# Patient Record
Sex: FEMALE | URBAN - METROPOLITAN AREA
[De-identification: names, ages, dates, MRNs, and addresses within clinical notes are randomized per-mention and may not be internally consistent; named-entity substitution may affect disease eponyms.]

---

## 2019-09-26 ENCOUNTER — APPOINTMENT (OUTPATIENT)
Dept: URBAN - METROPOLITAN AREA SURGERY 9 | Age: 54
Setting detail: DERMATOLOGY
End: 2019-09-27

## 2019-09-26 DIAGNOSIS — L72.0 EPIDERMAL CYST: ICD-10-CM

## 2019-09-26 DIAGNOSIS — L82.0 INFLAMED SEBORRHEIC KERATOSIS: ICD-10-CM

## 2019-09-26 DIAGNOSIS — L82.1 OTHER SEBORRHEIC KERATOSIS: ICD-10-CM

## 2019-09-26 PROCEDURE — OTHER COUNSELING: OTHER

## 2019-09-26 PROCEDURE — OTHER OTHER: OTHER

## 2019-09-26 PROCEDURE — OTHER REASSURANCE: OTHER

## 2019-09-26 PROCEDURE — OTHER LIQUID NITROGEN (COSMETIC): OTHER

## 2019-09-26 PROCEDURE — 99202 OFFICE O/P NEW SF 15 MIN: CPT

## 2019-09-26 ASSESSMENT — LOCATION SIMPLE DESCRIPTION DERM
LOCATION SIMPLE: LEFT CHEEK
LOCATION SIMPLE: LEFT LIP
LOCATION SIMPLE: RIGHT CHEEK
LOCATION SIMPLE: LEFT ELBOW

## 2019-09-26 ASSESSMENT — LOCATION ZONE DERM
LOCATION ZONE: ARM
LOCATION ZONE: FACE
LOCATION ZONE: LIP

## 2019-09-26 ASSESSMENT — LOCATION DETAILED DESCRIPTION DERM
LOCATION DETAILED: RIGHT INFERIOR MEDIAL MALAR CHEEK
LOCATION DETAILED: LEFT SUPERIOR CENTRAL MALAR CHEEK
LOCATION DETAILED: LEFT ELBOW
LOCATION DETAILED: RIGHT SUPERIOR CENTRAL MALAR CHEEK
LOCATION DETAILED: LEFT UPPER CUTANEOUS LIP

## 2019-09-26 NOTE — PROCEDURE: OTHER
Note Text (......Xxx Chief Complaint.): This diagnosis correlates with the
Detail Level: Simple
Other (Free Text): Offered LN2. Pt declines as it is not bothersome at this time.

## 2019-09-26 NOTE — PROCEDURE: REASSURANCE
Include Location In Plan?: No
Detail Level: Detailed
Detail Level: Simple
Additional Notes (Optional): Recommended seeing Melissa for extractions
Additional Note: Very small at this time. Had an SK on left cheek treated today and I recommend seeing how she heals from that before considering LN2 treatment to this lesion

## 2022-08-03 ENCOUNTER — APPOINTMENT (OUTPATIENT)
Dept: URBAN - METROPOLITAN AREA SURGERY 9 | Age: 57
Setting detail: DERMATOLOGY
End: 2022-08-03

## 2022-08-03 DIAGNOSIS — L72.8 OTHER FOLLICULAR CYSTS OF THE SKIN AND SUBCUTANEOUS TISSUE: ICD-10-CM

## 2022-08-03 PROBLEM — D48.5 NEOPLASM OF UNCERTAIN BEHAVIOR OF SKIN: Status: ACTIVE | Noted: 2022-08-03

## 2022-08-03 PROCEDURE — 11102 TANGNTL BX SKIN SINGLE LES: CPT

## 2022-08-03 PROCEDURE — OTHER BIOPSY BY SHAVE METHOD: OTHER

## 2022-08-03 ASSESSMENT — LOCATION DETAILED DESCRIPTION DERM: LOCATION DETAILED: COLUMELLA

## 2022-08-03 ASSESSMENT — LOCATION SIMPLE DESCRIPTION DERM: LOCATION SIMPLE: NOSE

## 2022-08-03 ASSESSMENT — LOCATION ZONE DERM: LOCATION ZONE: NOSE

## 2023-03-22 ENCOUNTER — APPOINTMENT (OUTPATIENT)
Dept: URBAN - METROPOLITAN AREA SURGERY 9 | Age: 58
Setting detail: DERMATOLOGY
End: 2023-03-22

## 2023-03-22 PROBLEM — D48.5 NEOPLASM OF UNCERTAIN BEHAVIOR OF SKIN: Status: ACTIVE | Noted: 2023-03-22

## 2023-03-22 PROCEDURE — 11102 TANGNTL BX SKIN SINGLE LES: CPT

## 2023-03-22 PROCEDURE — OTHER BIOPSY BY SHAVE METHOD: OTHER

## 2023-03-22 PROCEDURE — OTHER MIPS QUALITY: OTHER

## 2023-04-25 ENCOUNTER — APPOINTMENT (OUTPATIENT)
Dept: URBAN - METROPOLITAN AREA SURGERY 9 | Age: 58
Setting detail: DERMATOLOGY
End: 2023-04-27

## 2023-04-25 PROBLEM — D23.39 OTHER BENIGN NEOPLASM OF SKIN OF OTHER PARTS OF FACE: Status: ACTIVE | Noted: 2023-04-25

## 2023-04-25 PROCEDURE — OTHER CONSULTATION FOR EXCISION: OTHER

## 2023-04-25 PROCEDURE — OTHER RETURN TO REFERRING PROVIDER: OTHER

## 2023-04-25 PROCEDURE — OTHER EXCISION: OTHER

## 2023-04-25 PROCEDURE — 11441 EXC FACE-MM B9+MARG 0.6-1 CM: CPT

## 2023-04-25 PROCEDURE — OTHER CONSULTATION FOR EXCISION OF BENIGN LESIONS: OTHER

## 2023-04-25 PROCEDURE — OTHER OTHER: OTHER

## 2023-04-25 NOTE — PROCEDURE: OTHER
Other (Free Text): Pt will return for FTSG when site is confirmed clear.
Render Risk Assessment In Note?: no
Note Text (......Xxx Chief Complaint.): This diagnosis correlates with the
Detail Level: Detailed

## 2023-04-28 ENCOUNTER — APPOINTMENT (OUTPATIENT)
Dept: URBAN - METROPOLITAN AREA SURGERY 9 | Age: 58
Setting detail: DERMATOLOGY
End: 2023-05-01

## 2023-04-28 PROBLEM — D23.39 OTHER BENIGN NEOPLASM OF SKIN OF OTHER PARTS OF FACE: Status: ACTIVE | Noted: 2023-04-28

## 2023-04-28 PROCEDURE — OTHER ADDITIONAL PATHOLOGY SPECIMEN: OTHER

## 2023-04-28 PROCEDURE — 11441 EXC FACE-MM B9+MARG 0.6-1 CM: CPT | Mod: 58

## 2023-05-04 ENCOUNTER — APPOINTMENT (OUTPATIENT)
Dept: URBAN - METROPOLITAN AREA SURGERY 9 | Age: 58
Setting detail: DERMATOLOGY
End: 2023-05-09

## 2023-05-04 PROBLEM — D23.39 OTHER BENIGN NEOPLASM OF SKIN OF OTHER PARTS OF FACE: Status: ACTIVE | Noted: 2023-05-04

## 2023-05-04 PROCEDURE — OTHER EXCISION: OTHER

## 2023-05-04 PROCEDURE — OTHER OTHER: OTHER

## 2023-05-04 PROCEDURE — 11441 EXC FACE-MM B9+MARG 0.6-1 CM: CPT | Mod: 79

## 2023-05-04 NOTE — PROCEDURE: EXCISION
0 = understands/communicates without difficulty Complex Repair Preamble Text (Leave Blank If You Do Not Want): Extensive wide undermining was performed.

## 2023-05-04 NOTE — PROCEDURE: OTHER
Render Risk Assessment In Note?: no
Note Text (......Xxx Chief Complaint.): This diagnosis correlates with the
Detail Level: Detailed
Other (Free Text): Pt will return in 5 days for reconstruction, pending pathology results. Pt mentioned that if the defect becomes too large or if a cosmetic revision is needed, pt has appt already scheduled on 6/21/23 with Dr. Kendal Crump with [facility].

## 2023-05-09 ENCOUNTER — APPOINTMENT (OUTPATIENT)
Dept: URBAN - METROPOLITAN AREA SURGERY 9 | Age: 58
Setting detail: DERMATOLOGY
End: 2023-05-09

## 2023-05-09 PROBLEM — D23.39 OTHER BENIGN NEOPLASM OF SKIN OF OTHER PARTS OF FACE: Status: ACTIVE | Noted: 2023-05-09

## 2023-05-09 PROCEDURE — 15260 FTH/GFT FR N/E/E/L 20 SQCM/<: CPT | Mod: 79

## 2023-05-09 PROCEDURE — OTHER REPAIR NOTE: OTHER

## 2023-05-09 NOTE — PROCEDURE: REPAIR NOTE
No Repair - Repaired With Adjacent Surgical Defect Text (Leave Blank If You Do Not Want): After obtaining clear surgical margins the defect was repaired concurrently with another surgical defect which was in close approximation. spouse

## 2023-05-09 NOTE — PROCEDURE: REPAIR NOTE
Post-Care Instructions: I reviewed with the patient in detail post-care instructions. Patient is not to engage in any heavy lifting, exercise, or swimming for the next 14 days. Should the patient develop any fevers, chills, bleeding, severe pain patient will contact the office immediately. HÉCTOR/margi kellyer.

## 2023-05-09 NOTE — PROCEDURE: REPAIR NOTE
13-May-2020 10:08 Burow's Graft Text: The defect edges were debeveled with a #15 scalpel blade.  Given the location of the defect, shape of the defect, the proximity to free margins and the presence of a standing cone deformity a Burow's skin graft was deemed most appropriate. The standing cone was removed and this tissue was then trimmed to the shape of the primary defect. The adipose tissue was also removed until only dermis and epidermis were left.  The skin margins of the secondary defect were undermined to an appropriate distance in all directions utilizing iris scissors.  The secondary defect was closed with interrupted buried subcutaneous sutures.  The skin edges were then re-apposed with running  sutures.  The skin graft was then placed in the primary defect and oriented appropriately.